# Patient Record
(demographics unavailable — no encounter records)

---

## 2024-10-31 NOTE — DATA REVIEWED
[FreeTextEntry1] : I reviewed patients discharge summary with her and reviewed med doses to confirm.

## 2024-10-31 NOTE — HISTORY OF PRESENT ILLNESS
[Home] : at home, [unfilled] , at the time of the visit. [Other Location: e.g. Home (Enter Location, City,State)___] : at [unfilled] [Verbal consent obtained from patient] : the patient, [unfilled] [FreeTextEntry8] : Pt recently discharged from [facility] for abdominal pain requiring surgery. Unsure of dx. Patients symptoms well controlled, pain and nausea not present anymore. However, pt left her sack of meds out and is requesting refill for norvasc and gabapentin.

## 2024-10-31 NOTE — PLAN
[With new medications prescribed] : Treat in place: with new medications prescribed [Primary Care/Internal Medicine/PMD] : Primary Care/Internal Medicine/PMD [FreeTextEntry1] : Pt needs a refill sx well controlled, cant do narcotics pt will attempt